# Patient Record
Sex: FEMALE | URBAN - METROPOLITAN AREA
[De-identification: names, ages, dates, MRNs, and addresses within clinical notes are randomized per-mention and may not be internally consistent; named-entity substitution may affect disease eponyms.]

---

## 2019-03-05 ENCOUNTER — WORRISOME GROWTH - SEE NOTE (OUTPATIENT)
Dept: URBAN - METROPOLITAN AREA CLINIC 32 | Facility: CLINIC | Age: 44
Setting detail: DERMATOLOGY
End: 2019-03-05

## 2019-03-05 DIAGNOSIS — L57.0 ACTINIC KERATOSIS: ICD-10-CM

## 2019-03-05 PROCEDURE — 99202 OFFICE O/P NEW SF 15 MIN: CPT

## 2020-08-18 ENCOUNTER — WEB ENCOUNTER (OUTPATIENT)
Dept: URBAN - METROPOLITAN AREA CLINIC 96 | Facility: CLINIC | Age: 45
End: 2020-08-18

## 2020-08-18 ENCOUNTER — OFFICE VISIT (OUTPATIENT)
Dept: URBAN - METROPOLITAN AREA CLINIC 96 | Facility: CLINIC | Age: 45
End: 2020-08-18
Payer: SELF-PAY

## 2020-08-18 DIAGNOSIS — Z09 FOLLOW UP: ICD-10-CM

## 2020-08-18 DIAGNOSIS — K51.90 ULCERATIVE COLITIS: ICD-10-CM

## 2020-08-18 DIAGNOSIS — Z76.0 MEDICATION REFILL: ICD-10-CM

## 2020-08-18 DIAGNOSIS — R13.10 DYSPHAGIA: ICD-10-CM

## 2020-08-18 PROCEDURE — G9622 NO UNHEAL ETOH USER: HCPCS | Performed by: INTERNAL MEDICINE

## 2020-08-18 PROCEDURE — 3017F COLORECTAL CA SCREEN DOC REV: CPT | Performed by: INTERNAL MEDICINE

## 2020-08-18 PROCEDURE — G8420 CALC BMI NORM PARAMETERS: HCPCS | Performed by: INTERNAL MEDICINE

## 2020-08-18 PROCEDURE — 1036F TOBACCO NON-USER: CPT | Performed by: INTERNAL MEDICINE

## 2020-08-18 PROCEDURE — G8482 FLU IMMUNIZE ORDER/ADMIN: HCPCS | Performed by: INTERNAL MEDICINE

## 2020-08-18 PROCEDURE — G8427 DOCREV CUR MEDS BY ELIG CLIN: HCPCS | Performed by: INTERNAL MEDICINE

## 2020-08-18 PROCEDURE — G9903 PT SCRN TBCO ID AS NON USER: HCPCS | Performed by: INTERNAL MEDICINE

## 2020-08-18 PROCEDURE — 99214 OFFICE O/P EST MOD 30 MIN: CPT | Performed by: INTERNAL MEDICINE

## 2020-08-18 RX ORDER — HYDROCORTISONE 100 MG/60ML
INSERT 60 MILLILITERS (100 MG) BY RECTAL ROUTE ONCE DAILY ENEMA RECTAL 1
OUTPATIENT
Start: 2018-03-16

## 2020-08-18 RX ORDER — HYOSCYAMINE SULFATE 0.12 MG/1
PLACE 1 TABLET UNDER TONGUE BY TRANSLINGUAL ROUTE 4 TIMES A DAY AS NEEDED TABLET, ORALLY DISINTEGRATING ORAL
Qty: 30 | Refills: 1 | Status: ACTIVE | COMMUNITY
Start: 2018-03-16 | End: 1900-01-01

## 2020-08-18 RX ORDER — PANTOPRAZOLE SODIUM 40 MG/1
1 TABLET TABLET, DELAYED RELEASE ORAL BID
Qty: 60 TABLET | Refills: 11 | OUTPATIENT
Start: 2020-08-18

## 2020-08-18 RX ORDER — SULFASALAZINE 500 MG/1
6 TABLET TABLET ORAL ONCE A DAY
Qty: 180 TABLET | Refills: 4 | OUTPATIENT
Start: 2020-08-18 | End: 2021-01-14

## 2020-08-18 RX ORDER — HYDROCORTISONE 100 MG/60ML
INSERT 60 MILLILITERS (100 MG) BY RECTAL ROUTE ONCE DAILY ENEMA RECTAL 1
Qty: 7 | Refills: 1 | Status: ACTIVE | COMMUNITY
Start: 2018-03-16 | End: 1900-01-01

## 2020-08-18 RX ORDER — HYOSCYAMINE SULFATE 0.12 MG/1
PLACE 1 TABLET UNDER TONGUE BY TRANSLINGUAL ROUTE 4 TIMES A DAY AS NEEDED TABLET, ORALLY DISINTEGRATING ORAL
OUTPATIENT
Start: 2018-03-16

## 2020-08-18 RX ORDER — MESALAMINE 1000 MG/1
1 SUPPOSITORY AT BEDTIME SUPPOSITORY RECTAL ONCE A DAY
Qty: 30 | Refills: 4 | OUTPATIENT
Start: 2020-08-18 | End: 2021-01-14

## 2020-08-18 NOTE — HPI-TODAY'S VISIT:
Anthony Chew is a 46 y/o indiv with left sided abdominal pain, currently on Apriso 4tab), here for refractory sxs. . Pt is referred by Dr. North. . Anthony was diagnosed with UC at age 19.  She was initially started on Asacol.  Stopped therapies while pregnant. But has never done great.  She has taken canasa suppository while pregnant. . Today on 8/18/2020, anthony reports that she has flares on and off.  She had a history of dysphagia and atypical chest pain associated with food getting stuck in her upper chest. She has associated nausea.  It improved with pantoprazole.  She has up to 2 BM per day when normal, but when she has a flare, she has mucus and blood in the stool, and then develops constipation.  She has to then take miralax.  She is having some urgency with blood and mucus.  Some left lower cramping abdominal pain, worse when she is laying down and after eating.  Sxs are better if she is on liquid diet.  Has lost some weight because of this.  Lot of fatigue. . No joint pain or rashes.  .  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 6/2019: fecal calprotectin 194 . Colonoscopy: 6/2019: A segmental area of mildly congested and erythematous mucosa was found in the rectum and in the recto-sigmoid colon.  Biopsies were taken with a cold forceps for histology. Normal mucosa was found in the sigmoid colon, in the descending colon, at the splenic flexure, in the transverse colon, at the hepatic flexure, in the ascending colon and in the cecum.  Biopsies were taken with a cold forceps for histology. . A Rightcolon,biopsy: Colonicmucosawithnosignificanthistopathology. Nohistologicevidenceofactive,chronicormicroscopiccolitis.     B Transversecolon,biopsy: Colonicmucosawithnosignificanthistopathology. Nohistologicevidenceofactive,chronicormicroscopiccolitis.     C Leftcolon,biopsy: Colonicmucosawithnosignificanthistopathology. Nohistologicevidenceofactive,chronicormicroscopiccolitis.     D Rectum,rectosigmoid,biopsy: Activecolitis.     E Rectum,biopsy: Activecolitis.  .  12/2016 The terminal ileum appeared normal.  Biopsies were taken with a cold forceps for histology. A patchy area of moderately erythematous mucosa was found in the rectum.  Biopsies were taken with a cold forceps for histology. Normal mucosa was found in the entire colon. Internal hemorrhoids were found during retroflexion.  The hemorrhoids were small. .

## 2020-08-19 ENCOUNTER — ERX REFILL RESPONSE (OUTPATIENT)
Dept: URBAN - METROPOLITAN AREA CLINIC 96 | Facility: CLINIC | Age: 45
End: 2020-08-19

## 2020-08-19 RX ORDER — MESALAMINE 1000 MG/1
1 SUPPOSITORY AT BEDTIME SUPPOSITORY RECTAL ONCE A DAY
Qty: 30 | Refills: 4 | OUTPATIENT

## 2020-08-19 RX ORDER — MESALAMINE 1000 MG/1
_INSERT 1 SUPPOSITORY RECTALLY AT BEDTIME SUPPOSITORY RECTAL
Qty: 30 SUPPOSITORY | Refills: 4 | OUTPATIENT

## 2020-10-19 ENCOUNTER — OFFICE VISIT (OUTPATIENT)
Dept: URBAN - METROPOLITAN AREA TELEHEALTH 2 | Facility: TELEHEALTH | Age: 45
End: 2020-10-19
Payer: SELF-PAY

## 2020-10-19 DIAGNOSIS — Z76.0 MEDICATION REFILL: ICD-10-CM

## 2020-10-19 DIAGNOSIS — R13.10 DYSPHAGIA: ICD-10-CM

## 2020-10-19 DIAGNOSIS — Z09 FOLLOW UP: ICD-10-CM

## 2020-10-19 DIAGNOSIS — K51.90 ULCERATIVE COLITIS: ICD-10-CM

## 2020-10-19 PROCEDURE — 1036F TOBACCO NON-USER: CPT | Performed by: INTERNAL MEDICINE

## 2020-10-19 PROCEDURE — 99214 OFFICE O/P EST MOD 30 MIN: CPT | Performed by: INTERNAL MEDICINE

## 2020-10-19 PROCEDURE — G8482 FLU IMMUNIZE ORDER/ADMIN: HCPCS | Performed by: INTERNAL MEDICINE

## 2020-10-19 PROCEDURE — G9903 PT SCRN TBCO ID AS NON USER: HCPCS | Performed by: INTERNAL MEDICINE

## 2020-10-19 PROCEDURE — G9622 NO UNHEAL ETOH USER: HCPCS | Performed by: INTERNAL MEDICINE

## 2020-10-19 PROCEDURE — 82306 VITAMIN D 25 HYDROXY: CPT | Performed by: INTERNAL MEDICINE

## 2020-10-19 PROCEDURE — 3017F COLORECTAL CA SCREEN DOC REV: CPT | Performed by: INTERNAL MEDICINE

## 2020-10-19 PROCEDURE — 82607 VITAMIN B-12: CPT | Performed by: INTERNAL MEDICINE

## 2020-10-19 PROCEDURE — G8420 CALC BMI NORM PARAMETERS: HCPCS | Performed by: INTERNAL MEDICINE

## 2020-10-19 PROCEDURE — G8427 DOCREV CUR MEDS BY ELIG CLIN: HCPCS | Performed by: INTERNAL MEDICINE

## 2020-10-19 RX ORDER — MESALAMINE 1000 MG/1
_INSERT 1 SUPPOSITORY RECTALLY AT BEDTIME SUPPOSITORY RECTAL
Qty: 30 SUPPOSITORY | Refills: 4 | Status: ACTIVE | COMMUNITY

## 2020-10-19 RX ORDER — HYDROCORTISONE 100 MG/60ML
INSERT 60 MILLILITERS (100 MG) BY RECTAL ROUTE ONCE DAILY ENEMA RECTAL 1
Status: ACTIVE | COMMUNITY
Start: 2018-03-16

## 2020-10-19 RX ORDER — PANTOPRAZOLE SODIUM 40 MG/1
1 TABLET TABLET, DELAYED RELEASE ORAL BID
Qty: 60 TABLET | Refills: 11 | Status: ACTIVE | COMMUNITY
Start: 2020-08-18

## 2020-10-19 RX ORDER — SULFASALAZINE 500 MG/1
6 TABLET TABLET ORAL ONCE A DAY
Qty: 180 TABLET | Refills: 4 | Status: ACTIVE | COMMUNITY
Start: 2020-08-18 | End: 2021-01-14

## 2020-10-19 RX ORDER — SULFASALAZINE 500 MG/1
6 TABLET TABLET ORAL ONCE A DAY
Qty: 540 TABLET | Refills: 4 | OUTPATIENT

## 2020-10-19 RX ORDER — HYOSCYAMINE SULFATE 0.12 MG/1
PLACE 1 TABLET UNDER TONGUE BY TRANSLINGUAL ROUTE 4 TIMES A DAY AS NEEDED TABLET, ORALLY DISINTEGRATING ORAL
Status: ACTIVE | COMMUNITY
Start: 2018-03-16

## 2020-10-19 NOTE — HPI-TODAY'S VISIT:
Jean Chew is a 44 y/o indiv with left sided UC, currently on sulfasalazine (4 tab) and previously  on Apriso (4tab), here for refractory sxs. . Pt is referred by Dr. North. . Pt was diagnosed with UC at age 19.  She was initially started on Asacol.  Stopped therapies while pregnant. But has never done great.  She has taken canasa suppository while pregnant. . Previously on 8/18/2020, pt reports that she has flares on and off.  She had a history of dysphagia and atypical chest pain associated with food getting stuck in her upper chest. She has associated nausea.  It improved with pantoprazole.  She has up to 2 BM per day when normal, but when she has a flare, she has mucus and blood in the stool, and then develops constipation.  She has to then take miralax.  She is having some urgency with blood and mucus.  Some left lower cramping abdominal pain, worse when she is laying down and after eating.  Sxs are better if she is on liquid diet.  Has lost some weight because of this.  Lot of fatigue. . Today on 10/19/2020, pt reports that since starting the sulfasalazine (4 tab), she is feeling great.  She no longer having abdominal pain, diarrhea or rectal bleeding. No longer taking Levsin.  No longer having to take the protonix.  Made her sleepy, so she took it at night. 	 . No joint pain or rashes. . 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 6/2019: fecal calprotectin 194 . Colonoscopy: 6/2019: A segmental area of mildly congested and erythematous mucosa was found in the rectum and in the recto-sigmoid colon.  Biopsies were taken with a cold forceps for histology. Normal mucosa was found in the sigmoid colon, in the descending colon, at the splenic flexure, in the transverse colon, at the hepatic flexure, in the ascending colon and in the cecum.  Biopsies were taken with a cold forceps for histology. . A Rightcolon,biopsy: Colonicmucosawithnosignificanthistopathology. Nohistologicevidenceofactive,chronicormicroscopiccolitis.     B Transversecolon,biopsy: Colonicmucosawithnosignificanthistopathology. Nohistologicevidenceofactive,chronicormicroscopiccolitis.     C Leftcolon,biopsy: Colonicmucosawithnosignificanthistopathology. Nohistologicevidenceofactive,chronicormicroscopiccolitis.     D Rectum,rectosigmoid,biopsy: Activecolitis.     E Rectum,biopsy: Activecolitis.  . 12/2016 The terminal ileum appeared normal.  Biopsies were taken with a cold forceps for histology. A patchy area of moderately erythematous mucosa was found in the rectum.  Biopsies were taken with a cold forceps for histology. Normal mucosa was found in the entire colon. Internal hemorrhoids were found during retroflexion.  The hemorrhoids were small. .

## 2020-10-28 ENCOUNTER — ACNE/ROSACEA (OUTPATIENT)
Dept: URBAN - METROPOLITAN AREA CLINIC 32 | Facility: CLINIC | Age: 45
Setting detail: DERMATOLOGY
End: 2020-10-28

## 2020-10-28 ENCOUNTER — RX ONLY (RX ONLY)
Age: 45
End: 2020-10-28

## 2020-10-28 DIAGNOSIS — L57.0 ACTINIC KERATOSIS: ICD-10-CM

## 2020-10-28 PROCEDURE — 99213 OFFICE O/P EST LOW 20 MIN: CPT

## 2020-11-20 ENCOUNTER — RX ONLY (RX ONLY)
Age: 45
End: 2020-11-20

## 2020-11-20 RX ORDER — SPIRONOLACTONE 50 MG/1
2 TABLET TABLET, FILM COATED ORAL NIGHTLY
Qty: 180 | Refills: 1
Start: 2020-11-20

## 2020-12-02 LAB
A/G RATIO: 2.4
ALBUMIN: 4.8
ALKALINE PHOSPHATASE: 46
ALT (SGPT): 14
AST (SGOT): 21
BASO (ABSOLUTE): 0.1
BASOS: 1
BILIRUBIN, TOTAL: 0.5
BUN/CREATININE RATIO: 12
BUN: 11
C-REACTIVE PROTEIN, QUANT: <1
CALCIUM: 9.2
CARBON DIOXIDE, TOTAL: 22
CHLORIDE: 100
CREATININE: 0.91
EGFR IF AFRICN AM: 88
EGFR IF NONAFRICN AM: 76
EOS (ABSOLUTE): 0.1
EOS: 1
FERRITIN, SERUM: 43
GLOBULIN, TOTAL: 2
GLUCOSE: 79
HEMATOCRIT: 39.8
HEMATOLOGY COMMENTS:: (no result)
HEMOGLOBIN: 13
IMMATURE CELLS: (no result)
IMMATURE GRANS (ABS): 0
IMMATURE GRANULOCYTES: 0
IRON BIND.CAP.(TIBC): 280
IRON SATURATION: 51
IRON: 142
LYMPHS (ABSOLUTE): 1.6
LYMPHS: 29
MCH: 28.3
MCHC: 32.7
MCV: 87
MONOCYTES(ABSOLUTE): 0.3
MONOCYTES: 6
NEUTROPHILS (ABSOLUTE): 3.4
NEUTROPHILS: 63
NRBC: (no result)
PLATELETS: 211
POTASSIUM: 4.3
PROTEIN, TOTAL: 6.8
RBC: 4.59
RDW: 13.4
SEDIMENTATION RATE-WESTERGREN: 2
SODIUM: 139
UIBC: 138
VITAMIN B12: 379
VITAMIN D, 25-HYDROXY: 40.8
WBC: 5.4

## 2021-06-22 ENCOUNTER — ERX REFILL RESPONSE (OUTPATIENT)
Dept: URBAN - METROPOLITAN AREA CLINIC 98 | Facility: CLINIC | Age: 46
End: 2021-06-22

## 2021-06-22 RX ORDER — SULFASALAZINE 500 MG/1
6 TABLET TABLET ORAL ONCE A DAY
Qty: 540 | Refills: 0

## 2022-01-21 ENCOUNTER — WEB ENCOUNTER (OUTPATIENT)
Dept: URBAN - METROPOLITAN AREA CLINIC 96 | Facility: CLINIC | Age: 47
End: 2022-01-21

## 2022-01-21 RX ORDER — PREDNISONE 10 MG/1
2 TABLETS TABLET ORAL ONCE A DAY
Qty: 60 | Refills: 0 | OUTPATIENT
Start: 2022-01-21 | End: 2022-02-20

## 2022-02-02 ENCOUNTER — OFFICE VISIT (OUTPATIENT)
Dept: URBAN - METROPOLITAN AREA CLINIC 96 | Facility: CLINIC | Age: 47
End: 2022-02-02
Payer: SELF-PAY

## 2022-02-02 ENCOUNTER — WEB ENCOUNTER (OUTPATIENT)
Dept: URBAN - METROPOLITAN AREA CLINIC 96 | Facility: CLINIC | Age: 47
End: 2022-02-02

## 2022-02-02 ENCOUNTER — LAB OUTSIDE AN ENCOUNTER (OUTPATIENT)
Dept: URBAN - METROPOLITAN AREA CLINIC 96 | Facility: CLINIC | Age: 47
End: 2022-02-02

## 2022-02-02 DIAGNOSIS — R13.10 DYSPHAGIA: ICD-10-CM

## 2022-02-02 DIAGNOSIS — R19.7 DIARRHEA: ICD-10-CM

## 2022-02-02 DIAGNOSIS — Z76.0 MEDICATION REFILL: ICD-10-CM

## 2022-02-02 DIAGNOSIS — K21.9 GERD: ICD-10-CM

## 2022-02-02 DIAGNOSIS — K92.1 HEMATOCHEZIA: ICD-10-CM

## 2022-02-02 DIAGNOSIS — Z91.89 COLON CANCER HIGH RISK: ICD-10-CM

## 2022-02-02 DIAGNOSIS — K51.90 ULCERATIVE COLITIS: ICD-10-CM

## 2022-02-02 DIAGNOSIS — Z09 FOLLOW UP: ICD-10-CM

## 2022-02-02 PROCEDURE — 99215 OFFICE O/P EST HI 40 MIN: CPT | Performed by: INTERNAL MEDICINE

## 2022-02-02 RX ORDER — PREDNISONE 10 MG/1
2 TABLETS TABLET ORAL ONCE A DAY
Qty: 60 | Refills: 0 | Status: ACTIVE | COMMUNITY
Start: 2022-01-21 | End: 2022-02-20

## 2022-02-02 RX ORDER — MESALAMINE 1000 MG/1
_INSERT 1 SUPPOSITORY RECTALLY AT BEDTIME SUPPOSITORY RECTAL
Qty: 30 SUPPOSITORY | Refills: 4 | Status: DISCONTINUED | COMMUNITY

## 2022-02-02 RX ORDER — PANTOPRAZOLE SODIUM 40 MG/1
1 TABLET TABLET, DELAYED RELEASE ORAL BID
Qty: 60 TABLET | Refills: 11 | Status: DISCONTINUED | COMMUNITY
Start: 2020-08-18

## 2022-02-02 RX ORDER — HYOSCYAMINE SULFATE 0.12 MG/1
PLACE 1 TABLET UNDER TONGUE BY TRANSLINGUAL ROUTE 4 TIMES A DAY AS NEEDED TABLET, ORALLY DISINTEGRATING ORAL
Status: DISCONTINUED | COMMUNITY
Start: 2018-03-16

## 2022-02-02 RX ORDER — MESALAMINE 1000 MG/1
1 SUPPOSITORY AT BEDTIME SUPPOSITORY RECTAL ONCE A DAY
Qty: 90 | Refills: 4 | OUTPATIENT
Start: 2022-02-02 | End: 2023-04-28

## 2022-02-02 RX ORDER — SULFASALAZINE 500 MG/1
6 TABLET TABLET ORAL ONCE A DAY
Qty: 540 TABLET | Refills: 4 | OUTPATIENT

## 2022-02-02 RX ORDER — SULFASALAZINE 500 MG/1
6 TABLET TABLET ORAL ONCE A DAY
Qty: 540 | Refills: 0 | Status: ACTIVE | COMMUNITY

## 2022-02-02 RX ORDER — HYDROCORTISONE 100 MG/60ML
_INSERT 60 MILLILITERS (100 MG) BY RECTAL ROUTE ONCE DAILY ENEMA RECTAL 1
Status: DISCONTINUED | COMMUNITY
Start: 2018-03-16

## 2022-02-02 NOTE — HPI-TODAY'S VISIT:
Jean Chew is a 47 y/o indiv with left sided UC, currently on sulfasalazine (3-6tab) and previously  on Apriso (4tab), here for refractory sxs. . Pt is referred by Dr. North. . Pt was diagnosed with UC at age 19.  She was initially started on Asacol.  Stopped therapies while pregnant. But has never done great.  She has taken canasa suppository while pregnant. . Previously on 8/18/2020, pt reports that she has flares on and off.  She had a history of dysphagia and atypical chest pain associated with food getting stuck in her upper chest. She has associated nausea.  It improved with pantoprazole.  She has up to 2 BM per day when normal, but when she has a flare, she has mucus and blood in the stool, and then develops constipation.  She has to then take miralax.  She is having some urgency with blood and mucus.  Some left lower cramping abdominal pain, worse when she is laying down and after eating.  Sxs are better if she is on liquid diet.  Has lost some weight because of this.  Lot of fatigue. . Previously on 10/19/2020, pt reports that since starting the sulfasalazine (4 tab), she is feeling great.  She no longer having abdominal pain, diarrhea or rectal bleeding. No longer taking Levsin.  No longer having to take the protonix.  Made her sleepy, so she took it at night. . Today on 2/2/2022, pt reports that she was doing very well until recently.  However, had a flare and we gave her prednisone taper from 20mg.  She is now having some upper GI sxs with silent reflux.  She feels as if something is stuck in her stomach, took OTC Pepcid/zantac.  She is having a lot of mucus in the stool, mucus as well, 4 BM per day.  Has LLQ pain as well, nothing makes it better or worse. . No joint pain or rashes. . 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 6/2019: fecal calprotectin 194 . Colonoscopy: 6/2019: A segmental area of mildly congested and erythematous mucosa was found in the rectum and in the recto-sigmoid colon.  Biopsies were taken with a cold forceps for histology. Normal mucosa was found in the sigmoid colon, in the descending colon, at the splenic flexure, in the transverse colon, at the hepatic flexure, in the ascending colon and in the cecum.  Biopsies were taken with a cold forceps for histology. . A Rightcolon,biopsy: Colonicmucosawithnosignificanthistopathology. Nohistologicevidenceofactive,chronicormicroscopiccolitis.     B Transversecolon,biopsy: Colonicmucosawithnosignificanthistopathology. Nohistologicevidenceofactive,chronicormicroscopiccolitis.     C Leftcolon,biopsy: Colonicmucosawithnosignificanthistopathology. Nohistologicevidenceofactive,chronicormicroscopiccolitis.     D Rectum,rectosigmoid,biopsy: Activecolitis.     E Rectum,biopsy: Activecolitis.  . 12/2016 The terminal ileum appeared normal.  Biopsies were taken with a cold forceps for histology. A patchy area of moderately erythematous mucosa was found in the rectum.  Biopsies were taken with a cold forceps for histology. Normal mucosa was found in the entire colon. Internal hemorrhoids were found during retroflexion.  The hemorrhoids were small. .

## 2022-02-03 ENCOUNTER — WEB ENCOUNTER (OUTPATIENT)
Dept: URBAN - METROPOLITAN AREA CLINIC 96 | Facility: CLINIC | Age: 47
End: 2022-02-03

## 2022-02-03 LAB
A/G RATIO: 2.4
ALBUMIN: 4.8
ALKALINE PHOSPHATASE: 42
ALT (SGPT): 15
AST (SGOT): 16
BASO (ABSOLUTE): 0.1
BASOS: 1
BILIRUBIN, TOTAL: 0.5
BUN/CREATININE RATIO: 17
BUN: 13
CALCIUM: 9.8
CARBON DIOXIDE, TOTAL: 20
CHLORIDE: 103
CHOLESTEROL, TOTAL: 283
COMMENT:: (no result)
CREATININE: 0.75
EGFR IF AFRICN AM: 110
EGFR IF NONAFRICN AM: 95
EOS (ABSOLUTE): 0
EOS: 0
GLOBULIN, TOTAL: 2
GLUCOSE: 92
HDL CHOLESTEROL: 115
HEMATOCRIT: 40.8
HEMATOLOGY COMMENTS:: (no result)
HEMOGLOBIN: 13.2
IMMATURE CELLS: (no result)
IMMATURE GRANS (ABS): 0.1
IMMATURE GRANULOCYTES: 1
LDL CHOL CALC (NIH): 161
LYMPHS (ABSOLUTE): 1.2
LYMPHS: 13
MCH: 27.8
MCHC: 32.4
MCV: 86
MONOCYTES(ABSOLUTE): 0.3
MONOCYTES: 3
NEUTROPHILS (ABSOLUTE): 7.6
NEUTROPHILS: 82
NRBC: (no result)
PLATELETS: 213
POTASSIUM: 4.3
PROTEIN, TOTAL: 6.8
RBC: 4.75
RDW: 13.7
SODIUM: 141
T4,FREE(DIRECT): 1.09
TRIGLYCERIDES: 50
TSH: 1.68
VLDL CHOLESTEROL CAL: 7
WBC: 9.1

## 2022-02-03 RX ORDER — HYOSCYAMINE SULFATE 0.12 MG/1
1 TAB TABLET, ORALLY DISINTEGRATING ORAL TID
Qty: 270 TABLET | Refills: 4
Start: 2018-03-16 | End: 2023-04-29

## 2022-03-18 PROBLEM — 40739000 DYSPHAGIA: Status: ACTIVE | Noted: 2022-02-02

## 2022-03-18 PROBLEM — 235595009 GASTROESOPHAGEAL REFLUX DISEASE: Status: ACTIVE | Noted: 2022-02-02

## 2022-03-28 ENCOUNTER — WEB ENCOUNTER (OUTPATIENT)
Dept: URBAN - METROPOLITAN AREA CLINIC 96 | Facility: CLINIC | Age: 47
End: 2022-03-28

## 2022-03-29 ENCOUNTER — WEB ENCOUNTER (OUTPATIENT)
Dept: URBAN - METROPOLITAN AREA SURGERY CENTER 18 | Facility: SURGERY CENTER | Age: 47
End: 2022-03-29

## 2022-03-31 ENCOUNTER — CLAIMS CREATED FROM THE CLAIM WINDOW (OUTPATIENT)
Dept: URBAN - METROPOLITAN AREA CLINIC 4 | Facility: CLINIC | Age: 47
End: 2022-03-31
Payer: SELF-PAY

## 2022-03-31 ENCOUNTER — OFFICE VISIT (OUTPATIENT)
Dept: URBAN - METROPOLITAN AREA SURGERY CENTER 18 | Facility: SURGERY CENTER | Age: 47
End: 2022-03-31
Payer: SELF-PAY

## 2022-03-31 DIAGNOSIS — K31.89 FOCAL FOVEOLAR HYPERPLASIA: ICD-10-CM

## 2022-03-31 DIAGNOSIS — K31.89 ACQUIRED DEFORMITY OF DUODENUM: ICD-10-CM

## 2022-03-31 DIAGNOSIS — K51.919 ULCERATIVE COLITIS, UNSPECIFIED WITH UNSPECIFIED COMPLICATIONS: ICD-10-CM

## 2022-03-31 DIAGNOSIS — K51.00 ACUTE ULCERATIVE PANCOLITIS: ICD-10-CM

## 2022-03-31 DIAGNOSIS — K63.89 CYST OF DUODENUM: ICD-10-CM

## 2022-03-31 DIAGNOSIS — R12 BURNING REFLUX: ICD-10-CM

## 2022-03-31 PROCEDURE — 88305 TISSUE EXAM BY PATHOLOGIST: CPT | Performed by: PATHOLOGY

## 2022-03-31 PROCEDURE — 88312 SPECIAL STAINS GROUP 1: CPT | Performed by: PATHOLOGY

## 2022-03-31 PROCEDURE — 45380 COLONOSCOPY AND BIOPSY: CPT | Performed by: INTERNAL MEDICINE

## 2022-03-31 PROCEDURE — 43239 EGD BIOPSY SINGLE/MULTIPLE: CPT | Performed by: INTERNAL MEDICINE

## 2022-03-31 PROCEDURE — G8907 PT DOC NO EVENTS ON DISCHARG: HCPCS | Performed by: INTERNAL MEDICINE

## 2022-03-31 RX ORDER — MESALAMINE 1000 MG/1
1 SUPPOSITORY AT BEDTIME SUPPOSITORY RECTAL ONCE A DAY
Qty: 90 | Refills: 4 | Status: ACTIVE | COMMUNITY
Start: 2022-02-02 | End: 2023-04-28

## 2022-03-31 RX ORDER — SULFASALAZINE 500 MG/1
6 TABLET TABLET ORAL ONCE A DAY
Qty: 540 TABLET | Refills: 4 | Status: ACTIVE | COMMUNITY

## 2022-03-31 RX ORDER — HYOSCYAMINE SULFATE 0.12 MG/1
1 TAB TABLET, ORALLY DISINTEGRATING ORAL TID
Qty: 270 TABLET | Refills: 4 | Status: ACTIVE | COMMUNITY
Start: 2018-03-16 | End: 2023-04-29

## 2022-04-16 ENCOUNTER — WEB ENCOUNTER (OUTPATIENT)
Dept: URBAN - METROPOLITAN AREA CLINIC 96 | Facility: CLINIC | Age: 47
End: 2022-04-16

## 2022-08-02 ENCOUNTER — WEB ENCOUNTER (OUTPATIENT)
Dept: URBAN - METROPOLITAN AREA CLINIC 96 | Facility: CLINIC | Age: 47
End: 2022-08-02

## 2022-08-02 RX ORDER — PANTOPRAZOLE SODIUM 40 MG/1
1 TABLET TABLET, DELAYED RELEASE ORAL BID
Qty: 60 TABLET | Refills: 11
Start: 2020-08-18

## 2022-10-10 ENCOUNTER — WEB ENCOUNTER (OUTPATIENT)
Dept: URBAN - METROPOLITAN AREA CLINIC 98 | Facility: CLINIC | Age: 47
End: 2022-10-10

## 2022-10-10 RX ORDER — PANTOPRAZOLE SODIUM 40 MG/1
1 TABLET TABLET, DELAYED RELEASE ORAL ONCE A DAY
Qty: 30 TABLET | Refills: 11

## 2023-01-18 ENCOUNTER — APPOINTMENT (OUTPATIENT)
Dept: URBAN - METROPOLITAN AREA CLINIC 208 | Age: 48
Setting detail: DERMATOLOGY
End: 2023-01-23

## 2023-01-18 DIAGNOSIS — L70.0 ACNE VULGARIS: ICD-10-CM

## 2023-01-18 DIAGNOSIS — Z41.9 ENCOUNTER FOR PROCEDURE FOR PURPOSES OTHER THAN REMEDYING HEALTH STATE, UNSPECIFIED: ICD-10-CM

## 2023-01-18 PROCEDURE — OTHER PRESCRIPTION: OTHER

## 2023-01-18 PROCEDURE — 99214 OFFICE O/P EST MOD 30 MIN: CPT

## 2023-01-18 PROCEDURE — OTHER PATIENT SPECIFIC COUNSELING: OTHER

## 2023-01-18 PROCEDURE — OTHER PRESCRIPTION MEDICATION MANAGEMENT: OTHER

## 2023-01-18 PROCEDURE — OTHER COUNSELING: OTHER

## 2023-01-18 PROCEDURE — OTHER MIPS QUALITY: OTHER

## 2023-01-18 RX ORDER — SPIRONOLACTONE 25 MG/1
TABLET, FILM COATED ORAL
Qty: 180 | Refills: 3 | Status: ERX | COMMUNITY
Start: 2023-01-18

## 2023-01-18 RX ORDER — CLINDAMYCIN PHOSPHATE AND BENZOYL PEROXIDE 10; 37.5 MG/G; MG/G
GEL TOPICAL
Qty: 50 | Refills: 3 | Status: ERX | COMMUNITY
Start: 2023-01-18

## 2023-01-18 RX ORDER — TAZAROTENE 0.45 MG/G
LOTION TOPICAL
Qty: 45 | Refills: 8 | Status: ERX | COMMUNITY
Start: 2023-01-18

## 2023-01-18 ASSESSMENT — LOCATION DETAILED DESCRIPTION DERM
LOCATION DETAILED: RIGHT INFERIOR CENTRAL MALAR CHEEK
LOCATION DETAILED: LEFT CENTRAL MALAR CHEEK

## 2023-01-18 ASSESSMENT — LOCATION SIMPLE DESCRIPTION DERM
LOCATION SIMPLE: LEFT CHEEK
LOCATION SIMPLE: RIGHT CHEEK

## 2023-01-18 ASSESSMENT — LOCATION ZONE DERM: LOCATION ZONE: FACE

## 2023-01-18 NOTE — HPI: PIMPLES (ACNE)
What Type Of Note Output Would You Prefer (Optional)?: Standard Output
Is This A New Presentation, Or A Follow-Up?: Follow Up Acne
Additional Comments (Use Complete Sentences): Pt has been out of tret 0.1% for 1 mo\\nD/C SPN due to HA

## 2023-01-18 NOTE — PROCEDURE: PATIENT SPECIFIC COUNSELING
Detail Level: Zone
Soraida recommends cheek filler with Dr. Alcala or Dr. Alvarez to replace lost volume. If not ready to commit to filler, consider Morpheus 8.

## 2023-01-18 NOTE — PROCEDURE: COUNSELING
Tazorac Counseling:  Patient advised that medication is irritating and drying.  Patient may need to apply sparingly and wash off after an hour before eventually leaving it on overnight.  The patient verbalized understanding of the proper use and possible adverse effects of tazorac.  All of the patient's questions and concerns were addressed.
High Dose Vitamin A Pregnancy And Lactation Text: High dose vitamin A therapy is contraindicated during pregnancy and breast feeding.
Sarecycline Pregnancy And Lactation Text: This medication is Pregnancy Category D and not consider safe during pregnancy. It is also excreted in breast milk.
Topical Sulfur Applications Counseling: Topical Sulfur Counseling: Patient counseled that this medication may cause skin irritation or allergic reactions.  In the event of skin irritation, the patient was advised to reduce the amount of the drug applied or use it less frequently.   The patient verbalized understanding of the proper use and possible adverse effects of topical sulfur application.  All of the patient's questions and concerns were addressed.
Bactrim Counseling:  I discussed with the patient the risks of sulfa antibiotics including but not limited to GI upset, allergic reaction, drug rash, diarrhea, dizziness, photosensitivity, and yeast infections.  Rarely, more serious reactions can occur including but not limited to aplastic anemia, agranulocytosis, methemoglobinemia, blood dyscrasias, liver or kidney failure, lung infiltrates or desquamative/blistering drug rashes.
Dapsone Counseling: I discussed with the patient the risks of dapsone including but not limited to hemolytic anemia, agranulocytosis, rashes, methemoglobinemia, kidney failure, peripheral neuropathy, headaches, GI upset, and liver toxicity.  Patients who start dapsone require monitoring including baseline LFTs and weekly CBCs for the first month, then every month thereafter.  The patient verbalized understanding of the proper use and possible adverse effects of dapsone.  All of the patient's questions and concerns were addressed.
Benzoyl Peroxide Counseling: Patient counseled that medicine may cause skin irritation and bleach clothing.  In the event of skin irritation, the patient was advised to reduce the amount of the drug applied or use it less frequently.   The patient verbalized understanding of the proper use and possible adverse effects of benzoyl peroxide.  All of the patient's questions and concerns were addressed.
Bpo Recommendations: OTC Panoxyl
Erythromycin Pregnancy And Lactation Text: This medication is Pregnancy Category B and is considered safe during pregnancy. It is also excreted in breast milk.
Detail Level: Detailed
Spironolactone Counseling: Patient advised regarding risks of birth defects (for female patients), dizziness, menstrual irregularities, breast tenderness, hyperkalemia. The patient may need blood work to monitor liver and kidney function and potassium levels while on therapy if a higher dose is prescribed. The patient was instructed to discontinue medication or increase water intake (>64 oz) if dizziness occurs. It may be recommended for you to titrate up with your dose of Spironolactone to attempt to avoid the side effects. If patient plans to get pregnant or becomes pregnant while on medication, it is advised to stop the medication and contact the office. The patient verbalized understanding of the proper use and possible adverse effects of spironolactone.  All of the patient's questions and concerns were addressed.
Doxycycline Counseling:  Patient counseled regarding possible photosensitivity and increased risk for sunburn.  Advised the patient to take dosage at least 30 min before laying down, and always take with a full meal and large glass of water.  Patient instructed to avoid sunlight, if possible.  When exposed to sunlight, patients should wear protective clothing, sunglasses, and sunscreen.  The patient was instructed to call the office immediately if the following severe adverse effects occur:  hearing changes, easy bruising/bleeding, severe headache, or vision changes.  The patient verbalized understanding of the proper use and possible adverse effects of doxycycline.  All of the patient's questions and concerns were addressed.
Use Enhanced Medication Counseling?: Yes
Aklief counseling:  Patient advised to apply a pea-sized amount only at bedtime. If too drying, patient may add a non-comedogenic moisturizer.  The most commonly reported side effects including irritation, redness, scaling, dryness, stinging, burning, itching, and increased risk of sunburn.  The patient verbalized understanding of the proper use and possible adverse effects of retinoids.  All of the patient's questions and concerns were addressed.
Topical Sulfur Applications Pregnancy And Lactation Text: This medication is Pregnancy Category C and has an unknown safety profile during pregnancy. It is unknown if this topical medication is excreted in breast milk.
Benzoyl Peroxide Pregnancy And Lactation Text: This medication is Pregnancy Category C. It is unknown if benzoyl peroxide is excreted in breast milk.
Dapsone Pregnancy And Lactation Text: This medication is Pregnancy Category C and is not considered safe during pregnancy or breast feeding.
Aklief Pregnancy And Lactation Text: It is unknown if this medication is safe to use during pregnancy.  It is unknown if this medication is excreted in breast milk.  Breastfeeding women should use the topical cream on the smallest area of the skin for the shortest time needed while breastfeeding.  Do not apply to nipple and areola.
Tazorac Pregnancy And Lactation Text: This medication is not safe during pregnancy. It is unknown if this medication is excreted in breast milk.
Minocycline Counseling: Patient advised regarding possible photosensitivity and discoloration of the teeth, skin, lips, tongue and gums.  Patient instructed to avoid sunlight, if possible.  When exposed to sunlight, patients should wear protective clothing, sunglasses, and sunscreen.  The patient was instructed to call the office immediately if the following severe adverse effects occur:  hearing changes, easy bruising/bleeding, severe headache, or vision changes.  The patient verbalized understanding of the proper use and possible adverse effects of minocycline.  All of the patient's questions and concerns were addressed.
Bactrim Pregnancy And Lactation Text: This medication is Pregnancy Category D and is known to cause fetal risk.  It is also excreted in breast milk.
Cleanser Recommendations: Cetaphil Gentle Cleanser\\nCerave Facial Cleanser
Isotretinoin Counseling: Patient advised medication is a high dose of Vitamin A and should be taken with food for best absorption. No other Vitamin A supplements should be taken. Monthly visits are required for refills of medication and expect the course to be an average of 6 months. Female patients are required to have 2 forms of contraceptive. Side effects reviewed, but not limited to, dryness and joint pain. Instructions on how to combat side effects with OTC products reviewed in visit today. Pt to contact office should depression,suicidal/homicidal thoughts, broken bone occur. Avoid the following while on the medication and 30 days after completion: pregnancy, tetracycline antibiotics, tylenol, alcohol, donating blood, ablative lasers, tattoos/piercings. Notify your provider if you have a surgery scheduled while on the medication. If you have a history of crohns, ulcerative colitis or depression it is recommended you let your provider know prior to starting therapy.
Winlevi Counseling:  I discussed with the patient the risks of topical clascoterone including but not limited to erythema, scaling, itching, and stinging. Patient voiced their understanding.
Patient Specific Counseling (Will Not Stick From Patient To Patient): Soraida recommends these moisturizers: \\nNeocutis bio cream \\nSkinBetter Trio Rebalancing Moisture Treatment
Spironolactone Pregnancy And Lactation Text: This medication can cause feminization of the male fetus and should be avoided during pregnancy. The active metabolite is also found in breast milk.
Birth Control Pills Pregnancy And Lactation Text: This medication should be avoided if pregnant and for the first 30 days post-partum.
Topical Retinoid counseling:  Patient advised to apply a pea-sized amount only at bedtime. Patient may be asked to titrate up to nightly by your provider which means to only use 3 nights/week and increase as it becomes tolerable. It is recommended to apply a non-comedogenic moisturizer such as Cetaphil or Cerave after your topical retinoid application. If too drying, patient may moisturize both before and after application. The patient verbalized understanding of the proper use and possible adverse effects of retinoids.  All of the patient's questions and concerns were addressed.
Isotretinoin Pregnancy And Lactation Text: This medication is Pregnancy Category X and is considered extremely dangerous during pregnancy. It is unknown if it is excreted in breast milk.
Birth Control Pills Counseling: Birth Control Pill Counseling: I discussed with the patient the potential side effects of OCPs including but not limited to increased risk of stroke, heart attack, thrombophlebitis, deep venous thrombosis, hepatic adenomas, breast changes, GI upset, headaches, and depression.  The patient verbalized understanding of the proper use and possible adverse effects of OCPs. All of the patient's questions and concerns were addressed. The 3 OCPs recommended to you for acne include: KAY, Orthotricyclen and Sprintec.
Moisturizer Recommendations: Cetaphil Moisturizing Lotion\\nCerave Moisturizing Lotion
Azithromycin Counseling:  I discussed with the patient the risks of azithromycin including but not limited to GI upset, allergic reaction, drug rash, diarrhea, and yeast infections.
Topical Clindamycin Counseling: Patient counseled that this medication may cause skin irritation or allergic reactions.  In the event of skin irritation, the patient was advised to reduce the amount of the drug applied or use it less frequently.   The patient verbalized understanding of the proper use and possible adverse effects of clindamycin.  All of the patient's questions and concerns were addressed.
Tetracycline Counseling: Patient counseled regarding possible photosensitivity and increased risk for sunburn.  Patient instructed to avoid sunlight, if possible.  When exposed to sunlight, patients should wear protective clothing, sunglasses, and sunscreen.  The patient was instructed to call the office immediately if the following severe adverse effects occur:  hearing changes, easy bruising/bleeding, severe headache, or vision changes.  The patient verbalized understanding of the proper use and possible adverse effects of tetracycline.  All of the patient's questions and concerns were addressed. Patient understands to avoid pregnancy while on therapy due to potential birth defects.
Topical Clindamycin Pregnancy And Lactation Text: This medication is Pregnancy Category B and is considered safe during pregnancy. It is unknown if it is excreted in breast milk.
Sarecycline Counseling: Patient advised regarding possible photosensitivity and discoloration of the teeth, skin, lips, tongue and gums.  Patient instructed to avoid sunlight, if possible.  When exposed to sunlight, patients should wear protective clothing, sunglasses, and sunscreen.  The patient was instructed to call the office immediately if the following severe adverse effects occur:  hearing changes, easy bruising/bleeding, severe headache, or vision changes.  The patient verbalized understanding of the proper use and possible adverse effects of sarecycline.  All of the patient's questions and concerns were addressed.
Azelaic Acid Counseling: Patient counseled that medicine may cause skin irritation and to avoid applying near the eyes.  In the event of skin irritation, the patient was advised to reduce the amount of the drug applied or use it less frequently.   The patient verbalized understanding of the proper use and possible adverse effects of azelaic acid.  All of the patient's questions and concerns were addressed.
Doxycycline Pregnancy And Lactation Text: This medication is Pregnancy Category D and not consider safe during pregnancy. It is also excreted in breast milk but is considered safe for shorter treatment courses.
Winlevi Pregnancy And Lactation Text: This medication is considered safe during pregnancy and breastfeeding.
Azelaic Acid Pregnancy And Lactation Text: This medication is considered safe during pregnancy and breast feeding.
Erythromycin Counseling:  I discussed with the patient the risks of erythromycin including but not limited to GI upset, allergic reaction, drug rash, diarrhea, increase in liver enzymes, and yeast infections.
Include Pregnancy/Lactation Warning?: No
Azithromycin Pregnancy And Lactation Text: This medication is considered safe during pregnancy and is also secreted in breast milk.
Topical Retinoid Pregnancy And Lactation Text: This medication is Pregnancy Category C. It is unknown if this medication is excreted in breast milk.
Sunscreen Recommendations: Cerave AM Facial Moisturizing Lotion SPF 30\\nElta MD UV Clear Broad Spectrum SPF 46 for acne prone skin
High Dose Vitamin A Counseling: Side effects reviewed, pt to contact office should one occur.

## 2023-01-18 NOTE — PROCEDURE: PRESCRIPTION MEDICATION MANAGEMENT
Detail Level: Zone
Render In Strict Bullet Format?: No
Discontinue Regimen: Tret 0.1%
Initiate Treatment: Spot treat upcoming breakouts with Onexton in the morning.\\nApply a pea-sized amount of Arazlo as a thin layer to entire face every night.\\nTake 1 Spironolactone pill once daily. If tolerable, increase to 2 tablets at night.

## 2023-01-29 ENCOUNTER — WEB ENCOUNTER (OUTPATIENT)
Dept: URBAN - METROPOLITAN AREA CLINIC 96 | Facility: CLINIC | Age: 48
End: 2023-01-29

## 2023-01-29 RX ORDER — MESALAMINE 1000 MG/1
1 SUPPOSITORY AT BEDTIME SUPPOSITORY RECTAL ONCE A DAY
Qty: 90 | Refills: 4
Start: 2022-02-02 | End: 2024-04-24

## 2023-01-30 ENCOUNTER — WEB ENCOUNTER (OUTPATIENT)
Dept: URBAN - METROPOLITAN AREA CLINIC 98 | Facility: CLINIC | Age: 48
End: 2023-01-30

## 2023-01-30 RX ORDER — MESALAMINE 1000 MG/1
1 SUPPOSITORY AT BEDTIME SUPPOSITORY RECTAL ONCE A DAY
Qty: 90 | Refills: 4
Start: 2022-02-02 | End: 2024-04-24

## 2023-03-03 ENCOUNTER — WEB ENCOUNTER (OUTPATIENT)
Dept: URBAN - METROPOLITAN AREA CLINIC 96 | Facility: CLINIC | Age: 48
End: 2023-03-03

## 2023-03-03 RX ORDER — SULFASALAZINE 500 MG/1
6 TABLET TABLET ORAL ONCE A DAY
Qty: 180 TABLET | Refills: 11

## 2023-03-08 ENCOUNTER — WEB ENCOUNTER (OUTPATIENT)
Dept: URBAN - METROPOLITAN AREA CLINIC 96 | Facility: CLINIC | Age: 48
End: 2023-03-08

## 2023-03-08 RX ORDER — SULFASALAZINE 500 MG/1
6 TABLET TABLET ORAL ONCE A DAY
Qty: 180 TABLET | Refills: 11
End: 2024-03-07

## 2023-04-25 ENCOUNTER — DASHBOARD ENCOUNTERS (OUTPATIENT)
Age: 48
End: 2023-04-25

## 2023-04-25 ENCOUNTER — OFFICE VISIT (OUTPATIENT)
Dept: URBAN - METROPOLITAN AREA CLINIC 96 | Facility: CLINIC | Age: 48
End: 2023-04-25
Payer: SELF-PAY

## 2023-04-25 VITALS
WEIGHT: 159 LBS | SYSTOLIC BLOOD PRESSURE: 124 MMHG | HEART RATE: 69 BPM | HEIGHT: 68 IN | TEMPERATURE: 97 F | DIASTOLIC BLOOD PRESSURE: 82 MMHG | BODY MASS INDEX: 24.1 KG/M2

## 2023-04-25 DIAGNOSIS — R13.10 DYSPHAGIA: ICD-10-CM

## 2023-04-25 DIAGNOSIS — R19.7 DIARRHEA: ICD-10-CM

## 2023-04-25 DIAGNOSIS — Z76.0 MEDICATION REFILL: ICD-10-CM

## 2023-04-25 DIAGNOSIS — Z91.89 COLON CANCER HIGH RISK: ICD-10-CM

## 2023-04-25 DIAGNOSIS — K51.90 ULCERATIVE COLITIS: ICD-10-CM

## 2023-04-25 DIAGNOSIS — Z09 FOLLOW UP: ICD-10-CM

## 2023-04-25 DIAGNOSIS — K92.1 HEMATOCHEZIA: ICD-10-CM

## 2023-04-25 DIAGNOSIS — K21.9 GERD: ICD-10-CM

## 2023-04-25 PROCEDURE — 99215 OFFICE O/P EST HI 40 MIN: CPT | Performed by: INTERNAL MEDICINE

## 2023-04-25 RX ORDER — SULFASALAZINE 500 MG/1
6 TABLET TABLET ORAL ONCE A DAY
Qty: 180 TABLET | Refills: 11 | OUTPATIENT

## 2023-04-25 RX ORDER — HYOSCYAMINE SULFATE 0.12 MG/1
1 TAB TABLET, ORALLY DISINTEGRATING ORAL TID
Qty: 270 TABLET | Refills: 4 | COMMUNITY
Start: 2018-03-16 | End: 2023-04-29

## 2023-04-25 RX ORDER — SULFASALAZINE 500 MG/1
6 TABLET TABLET ORAL ONCE A DAY
Qty: 180 TABLET | Refills: 11 | COMMUNITY
End: 2024-03-07

## 2023-04-25 RX ORDER — MESALAMINE 1000 MG/1
1 SUPPOSITORY AT BEDTIME SUPPOSITORY RECTAL ONCE A DAY
Qty: 90 | Refills: 4 | COMMUNITY
Start: 2022-02-02 | End: 2024-04-24

## 2023-04-25 RX ORDER — MESALAMINE 1000 MG/1
1 SUPPOSITORY AT BEDTIME SUPPOSITORY RECTAL ONCE A DAY
Qty: 30 | Refills: 11 | OUTPATIENT

## 2023-04-25 RX ORDER — PREDNISONE 10 MG/1
1 TABLET TABLET ORAL ONCE A DAY
Qty: 30 TABLET | Refills: 0 | OUTPATIENT
Start: 2023-04-25 | End: 2023-05-25

## 2023-04-25 RX ORDER — PANTOPRAZOLE SODIUM 40 MG/1
1 TABLET TABLET, DELAYED RELEASE ORAL ONCE A DAY
Qty: 30 TABLET | Refills: 11 | OUTPATIENT
Start: 2023-04-25

## 2023-04-25 RX ORDER — PANTOPRAZOLE SODIUM 40 MG/1
1 TABLET TABLET, DELAYED RELEASE ORAL ONCE A DAY
Qty: 30 TABLET | Refills: 11 | COMMUNITY

## 2023-04-25 NOTE — PHYSICAL EXAM GASTROINTESTINAL
1st discharge call attempted. Chart reviewed. Left Message   Abdomen , soft, nontender, nondistended , no guarding or rigidity , no masses palpable , normal bowel sounds , Liver and Spleen , no hepatomegaly present , no hepatosplenomegaly , liver nontender , spleen not palpable

## 2023-04-25 NOTE — HPI-TODAY'S VISIT:
Jean Chew is a 49 y/o indiv with left sided UC, currently on sulfasalazine (4 tab) and previously  on Apriso (4tab), here for refractory sxs. . Pt is referred by Dr. North. . Pt was diagnosed with UC at age 19.  She was initially started on Asacol.  Stopped therapies while pregnant. But has never done great.  She has taken canasa suppository while pregnant. . Previously on 8/18/2020, pt reports that she has flares on and off.  She had a history of dysphagia and atypical chest pain associated with food getting stuck in her upper chest. She has associated nausea.  It improved with pantoprazole.  She has up to 2 BM per day when normal, but when she has a flare, she has mucus and blood in the stool, and then develops constipation.  She has to then take miralax.  She is having some urgency with blood and mucus.  Some left lower cramping abdominal pain, worse when she is laying down and after eating.  Sxs are better if she is on liquid diet.  Has lost some weight because of this.  Lot of fatigue. Previously on 10/19/2020, pt reports that since starting the sulfasalazine (4 tab), she is feeling great.  She no longer having abdominal pain, diarrhea or rectal bleeding. No longer taking Levsin.  No longer having to take the protonix.  Made her sleepy, so she took it at night. Previously on 2/2/2022, pt reports that she was doing very well until recently.  However, had a flare and we gave her prednisone taper from 20mg.  She is now having some upper GI sxs with silent reflux.  She feels as if something is stuck in her stomach, took OTC Pepcid/zantac.  She is having a lot of mucus in the stool, mucus as well, 4 BM per day.  Has LLQ pain as well, nothing makes it better or worse. . Today on 4/25/2023, pt reports she is doing well.  NO diarrhea, rectal bleeding.  Periodic abdominal pain, mucus about 2 times per week and occasional blood. . No joint pain or rashes. . 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 6/2019: fecal calprotectin 194 . Colonoscopy: 6/2019: A segmental area of mildly congested and erythematous mucosa was found in the rectum and in the recto-sigmoid colon.  Biopsies were taken with a cold forceps for histology. Normal mucosa was found in the sigmoid colon, in the descending colon, at the splenic flexure, in the transverse colon, at the hepatic flexure, in the ascending colon and in the cecum.  Biopsies were taken with a cold forceps for histology. . A Rightcolon,biopsy: Colonicmucosawithnosignificanthistopathology. Nohistologicevidenceofactive,chronicormicroscopiccolitis.     B Transversecolon,biopsy: Colonicmucosawithnosignificanthistopathology. Nohistologicevidenceofactive,chronicormicroscopiccolitis.     C Leftcolon,biopsy: Colonicmucosawithnosignificanthistopathology. Nohistologicevidenceofactive,chronicormicroscopiccolitis.     D Rectum,rectosigmoid,biopsy: Activecolitis.     E Rectum,biopsy: Activecolitis.  . 12/2016 The terminal ileum appeared normal.  Biopsies were taken with a cold forceps for histology. A patchy area of moderately erythematous mucosa was found in the rectum.  Biopsies were taken with a cold forceps for histology. Normal mucosa was found in the entire colon. Internal hemorrhoids were found during retroflexion.  The hemorrhoids were small. . 3/2022: The ascending colon and cecum appeared normal. Biopsies were taken with a cold forceps for histology. The pathology specimen was placed into Bottle Number 3. The transverse colon appeared normal. Biopsies were taken with a cold forceps for histology. The pathology specimen was placed into Bottle Number 4. The descending colon appeared normal. Biopsies were taken with a cold forceps for histology. The pathology specimen was placed into Bottle Number 5. The sigmoid colon appeared normal. Biopsies were taken with a cold forceps for histology. The pathology specimen was placed into Bottle Number 6. A patchy area of mildly erythematous mucosa was found in the rectum. This was biopsied with a cold forceps for histology. The pathology specimen was placed into Bottle Number 7.  The exam was otherwise without abnormality  Overall, good control of UC, lopes score 0 to 1 throughout . (A) Duodenum, Biopsy: NO SIGNIFICANT ABNORMALITY. (B) Stomach, Antrum, Biopsy: NO SIGNIFICANT ABNORMALITY. (C) Colon, Ascending, Biopsy: NO SIGNIFICANT ABNORMALITY. (D) Colon, Transverse, Biopsy: NO SIGNIFICANT ABNORMALITY. (E) Colon, Descending, Biopsy: NO SIGNIFICANT ABNORMALITY. (F) Colon, Sigmoid, Biopsy: NO SIGNIFICANT ABNORMALITY. (G) Rectum, Biopsy: DIFFUSE CHRONIC ACTIVE COLITIS, CONSISTENT WITH ULCERATIVE COLITIS. See Comment. Negative for Infectious Organisms, Dysplasia or Malignancy. COMMENT: The biopsies show a diffuse and uniform chronic active colitis with a universal involvement of the rectum and absence of granulomas, supporting a diagnosis of ulcerative colitis. The presence of histological features of chronicity excludes acute infectious colitis. .

## 2023-07-26 ENCOUNTER — RX ONLY (RX ONLY)
Age: 48
End: 2023-07-26

## 2023-07-26 RX ORDER — SPIRONOLACTONE 25 MG/1
TABLET, FILM COATED ORAL
Qty: 270 | Refills: 1 | Status: ERX

## 2023-12-18 ENCOUNTER — RX ONLY (RX ONLY)
Age: 48
End: 2023-12-18

## 2023-12-18 RX ORDER — TRETIONIN 0.5 MG/G
CREAM TOPICAL
Qty: 20 | Refills: 0 | Status: ERX | COMMUNITY
Start: 2023-12-18

## 2024-06-12 ENCOUNTER — APPOINTMENT (OUTPATIENT)
Dept: URBAN - METROPOLITAN AREA CLINIC 208 | Age: 49
Setting detail: DERMATOLOGY
End: 2024-06-12

## 2024-06-12 DIAGNOSIS — L70.0 ACNE VULGARIS: ICD-10-CM

## 2024-06-12 DIAGNOSIS — L81.1 CHLOASMA: ICD-10-CM

## 2024-06-12 PROCEDURE — OTHER MIPS QUALITY: OTHER

## 2024-06-12 PROCEDURE — OTHER PRESCRIPTION: OTHER

## 2024-06-12 PROCEDURE — 99214 OFFICE O/P EST MOD 30 MIN: CPT

## 2024-06-12 PROCEDURE — OTHER PATIENT SPECIFIC COUNSELING: OTHER

## 2024-06-12 PROCEDURE — OTHER COUNSELING: OTHER

## 2024-06-12 PROCEDURE — OTHER PRESCRIPTION MEDICATION MANAGEMENT: OTHER

## 2024-06-12 PROCEDURE — OTHER OTHER: OTHER

## 2024-06-12 RX ORDER — TRETIONIN 1 MG/G
CREAM TOPICAL
Qty: 45 | Refills: 3 | Status: ERX | COMMUNITY
Start: 2024-06-12

## 2024-06-12 RX ORDER — SPIRONOLACTONE 100 MG/1
TABLET, FILM COATED ORAL
Qty: 90 | Refills: 3 | Status: ERX | COMMUNITY
Start: 2024-06-12

## 2024-06-12 ASSESSMENT — LOCATION SIMPLE DESCRIPTION DERM
LOCATION SIMPLE: RIGHT FOREHEAD
LOCATION SIMPLE: LEFT CHEEK
LOCATION SIMPLE: LEFT FOREHEAD

## 2024-06-12 ASSESSMENT — LOCATION DETAILED DESCRIPTION DERM
LOCATION DETAILED: LEFT FOREHEAD
LOCATION DETAILED: RIGHT FOREHEAD
LOCATION DETAILED: LEFT CENTRAL MALAR CHEEK

## 2024-06-12 ASSESSMENT — LOCATION ZONE DERM: LOCATION ZONE: FACE

## 2024-06-12 NOTE — HPI: PIMPLES (ACNE)
How Severe Is Your Acne?: mild
Is This A New Presentation, Or A Follow-Up?: Follow Up Acne
Additional Comments (Use Complete Sentences): Pt currently on spironolactone 75mg take 3 of 25mg, would like to just take 1 pill, would like to know if she needs to increase to 100mg once daily, no side effects. Pt no longer using Arazlo, would like to go back to tretinoin .1% cream, uses onexton as needed for spot treat

## 2024-06-12 NOTE — PROCEDURE: PRESCRIPTION MEDICATION MANAGEMENT
Initiate Treatment: -bleaching cream compound apply pea size amount nightly for next 2 months then change over to regular tretinoin nightly for 2 months, repeat
Render In Strict Bullet Format?: Yes
Detail Level: Zone

## 2024-06-12 NOTE — PROCEDURE: COUNSELING
Minocycline Pregnancy And Lactation Text: This medication is Pregnancy Category D and not consider safe during pregnancy. It is also excreted in breast milk.
Topical Retinoid Pregnancy And Lactation Text: This medication is Pregnancy Category C. It is unknown if this medication is excreted in breast milk.
Azelaic Acid Counseling: Patient counseled that medicine may cause skin irritation and to avoid applying near the eyes.  In the event of skin irritation, the patient was advised to reduce the amount of the drug applied or use it less frequently.   The patient verbalized understanding of the proper use and possible adverse effects of azelaic acid.  All of the patient's questions and concerns were addressed.
Doxycycline Counseling:  Patient counseled regarding possible photosensitivity and increased risk for sunburn.  Advised the patient to take dosage at least 30 min before laying down, and always take with a full meal and large glass of water.  Patient instructed to avoid sunlight, if possible.  When exposed to sunlight, patients should wear protective clothing, sunglasses, and sunscreen.  The patient was instructed to call the office immediately if the following severe adverse effects occur:  hearing changes, easy bruising/bleeding, severe headache, or vision changes.  The patient verbalized understanding of the proper use and possible adverse effects of doxycycline.  All of the patient's questions and concerns were addressed.
Tetracycline Counseling: Patient counseled regarding possible photosensitivity and increased risk for sunburn.  Patient instructed to avoid sunlight, if possible.  When exposed to sunlight, patients should wear protective clothing, sunglasses, and sunscreen.  The patient was instructed to call the office immediately if the following severe adverse effects occur:  hearing changes, easy bruising/bleeding, severe headache, or vision changes.  The patient verbalized understanding of the proper use and possible adverse effects of tetracycline.  All of the patient's questions and concerns were addressed. Patient understands to avoid pregnancy while on therapy due to potential birth defects.
Isotretinoin Counseling: Patient advised medication is a high dose of Vitamin A and should be taken with food for best absorption. No other Vitamin A supplements should be taken. Monthly visits are required for refills of medication and expect the course to be an average of 6 months. Female patients are required to have 2 forms of contraceptive. Side effects reviewed, but not limited to, dryness and joint pain. Instructions on how to combat side effects with OTC products reviewed in visit today. Pt to contact office should depression,suicidal/homicidal thoughts, broken bone occur. Avoid the following while on the medication and 30 days after completion: pregnancy, tetracycline antibiotics, tylenol, alcohol, donating blood, ablative lasers, tattoos/piercings. Notify your provider if you have a surgery scheduled while on the medication. If you have a history of crohns, ulcerative colitis or depression it is recommended you let your provider know prior to starting therapy.
Include Pregnancy/Lactation Warning?: No
Spironolactone Pregnancy And Lactation Text: This medication can cause feminization of the male fetus and should be avoided during pregnancy. The active metabolite is also found in breast milk.
Topical Clindamycin Pregnancy And Lactation Text: This medication is Pregnancy Category B and is considered safe during pregnancy. It is unknown if it is excreted in breast milk.
Bactrim Pregnancy And Lactation Text: This medication is Pregnancy Category D and is known to cause fetal risk.  It is also excreted in breast milk.
Doxycycline Pregnancy And Lactation Text: This medication is Pregnancy Category D and not consider safe during pregnancy. It is also excreted in breast milk but is considered safe for shorter treatment courses.
Tazorac Counseling:  Patient advised that medication is irritating and drying.  Patient may need to apply sparingly and wash off after an hour before eventually leaving it on overnight.  The patient verbalized understanding of the proper use and possible adverse effects of tazorac.  All of the patient's questions and concerns were addressed.
Sarecycline Counseling: Patient advised regarding possible photosensitivity and discoloration of the teeth, skin, lips, tongue and gums.  Patient instructed to avoid sunlight, if possible.  When exposed to sunlight, patients should wear protective clothing, sunglasses, and sunscreen.  The patient was instructed to call the office immediately if the following severe adverse effects occur:  hearing changes, easy bruising/bleeding, severe headache, or vision changes.  The patient verbalized understanding of the proper use and possible adverse effects of sarecycline.  All of the patient's questions and concerns were addressed.
Azithromycin Counseling:  I discussed with the patient the risks of azithromycin including but not limited to GI upset, allergic reaction, drug rash, diarrhea, and yeast infections.
High Dose Vitamin A Counseling: Side effects reviewed, pt to contact office should one occur.
Birth Control Pills Counseling: Birth Control Pill Counseling: I discussed with the patient the potential side effects of OCPs including but not limited to increased risk of stroke, heart attack, thrombophlebitis, deep venous thrombosis, hepatic adenomas, breast changes, GI upset, headaches, and depression.  The patient verbalized understanding of the proper use and possible adverse effects of OCPs. All of the patient's questions and concerns were addressed. The 3 OCPs recommended to you for acne include: KAY, Orthotricyclen and Sprintec.
Topical Sulfur Applications Counseling: Topical Sulfur Counseling: Patient counseled that this medication may cause skin irritation or allergic reactions.  In the event of skin irritation, the patient was advised to reduce the amount of the drug applied or use it less frequently.   The patient verbalized understanding of the proper use and possible adverse effects of topical sulfur application.  All of the patient's questions and concerns were addressed.
Isotretinoin Pregnancy And Lactation Text: This medication is Pregnancy Category X and is considered extremely dangerous during pregnancy. It is unknown if it is excreted in breast milk.
Sunscreen Recommendations: Cerave AM Facial Moisturizing Lotion SPF 30\\nElta MD UV Clear Broad Spectrum SPF 46 for acne prone skin
Azelaic Acid Pregnancy And Lactation Text: This medication is considered safe during pregnancy and breast feeding.
Bpo Recommendations: OTC Panoxyl
High Dose Vitamin A Pregnancy And Lactation Text: High dose vitamin A therapy is contraindicated during pregnancy and breast feeding.
Use Enhanced Medication Counseling?: Yes
Azithromycin Pregnancy And Lactation Text: This medication is considered safe during pregnancy and is also secreted in breast milk.
Erythromycin Counseling:  I discussed with the patient the risks of erythromycin including but not limited to GI upset, allergic reaction, drug rash, diarrhea, increase in liver enzymes, and yeast infections.
Aklief counseling:  Patient advised to apply a pea-sized amount only at bedtime. If too drying, patient may add a non-comedogenic moisturizer.  The most commonly reported side effects including irritation, redness, scaling, dryness, stinging, burning, itching, and increased risk of sunburn.  The patient verbalized understanding of the proper use and possible adverse effects of retinoids.  All of the patient's questions and concerns were addressed.
Winlevi Counseling:  I discussed with the patient the risks of topical clascoterone including but not limited to erythema, scaling, itching, and stinging. Patient voiced their understanding.
Dapsone Counseling: I discussed with the patient the risks of dapsone including but not limited to hemolytic anemia, agranulocytosis, rashes, methemoglobinemia, kidney failure, peripheral neuropathy, headaches, GI upset, and liver toxicity.  Patients who start dapsone require monitoring including baseline LFTs and weekly CBCs for the first month, then every month thereafter.  The patient verbalized understanding of the proper use and possible adverse effects of dapsone.  All of the patient's questions and concerns were addressed.
Benzoyl Peroxide Counseling: Patient counseled that medicine may cause skin irritation and bleach clothing.  In the event of skin irritation, the patient was advised to reduce the amount of the drug applied or use it less frequently.   The patient verbalized understanding of the proper use and possible adverse effects of benzoyl peroxide.  All of the patient's questions and concerns were addressed.
Cleanser Recommendations: Cetaphil Gentle Cleanser\\nCerave Facial Cleanser
Birth Control Pills Pregnancy And Lactation Text: This medication should be avoided if pregnant and for the first 30 days post-partum.
Topical Sulfur Applications Pregnancy And Lactation Text: This medication is Pregnancy Category C and has an unknown safety profile during pregnancy. It is unknown if this topical medication is excreted in breast milk.
Tazorac Pregnancy And Lactation Text: This medication is not safe during pregnancy. It is unknown if this medication is excreted in breast milk.
Dapsone Pregnancy And Lactation Text: This medication is Pregnancy Category C and is not considered safe during pregnancy or breast feeding.
Detail Level: Zone
Winlevi Pregnancy And Lactation Text: This medication is considered safe during pregnancy and breastfeeding.
Bactrim Counseling:  I discussed with the patient the risks of sulfa antibiotics including but not limited to GI upset, allergic reaction, drug rash, diarrhea, dizziness, photosensitivity, and yeast infections.  Rarely, more serious reactions can occur including but not limited to aplastic anemia, agranulocytosis, methemoglobinemia, blood dyscrasias, liver or kidney failure, lung infiltrates or desquamative/blistering drug rashes.
Minocycline Counseling: Patient advised regarding possible photosensitivity and discoloration of the teeth, skin, lips, tongue and gums.  Patient instructed to avoid sunlight, if possible.  When exposed to sunlight, patients should wear protective clothing, sunglasses, and sunscreen.  The patient was instructed to call the office immediately if the following severe adverse effects occur:  hearing changes, easy bruising/bleeding, severe headache, or vision changes.  The patient verbalized understanding of the proper use and possible adverse effects of minocycline.  All of the patient's questions and concerns were addressed.
Topical Retinoid counseling:  Patient advised to apply a pea-sized amount only at bedtime. Patient may be asked to titrate up to nightly by your provider which means to only use 3 nights/week and increase as it becomes tolerable. It is recommended to apply a non-comedogenic moisturizer such as Cetaphil or Cerave after your topical retinoid application. If too drying, patient may moisturize both before and after application. The patient verbalized understanding of the proper use and possible adverse effects of retinoids.  All of the patient's questions and concerns were addressed.
Topical Clindamycin Counseling: Patient counseled that this medication may cause skin irritation or allergic reactions.  In the event of skin irritation, the patient was advised to reduce the amount of the drug applied or use it less frequently.   The patient verbalized understanding of the proper use and possible adverse effects of clindamycin.  All of the patient's questions and concerns were addressed.
Spironolactone Counseling: Patient advised regarding risks of birth defects (for female patients), dizziness, menstrual irregularities, breast tenderness, hyperkalemia. The patient may need blood work to monitor liver and kidney function and potassium levels while on therapy if a higher dose is prescribed. The patient was instructed to discontinue medication or increase water intake (>64 oz) if dizziness occurs. It may be recommended for you to titrate up with your dose of Spironolactone to attempt to avoid the side effects. If patient plans to get pregnant or becomes pregnant while on medication, it is advised to stop the medication and contact the office. The patient verbalized understanding of the proper use and possible adverse effects of spironolactone.  All of the patient's questions and concerns were addressed.
Benzoyl Peroxide Pregnancy And Lactation Text: This medication is Pregnancy Category C. It is unknown if benzoyl peroxide is excreted in breast milk.
Moisturizer Recommendations: Cetaphil Moisturizing Lotion\\nCerave Moisturizing Lotion
Aklief Pregnancy And Lactation Text: It is unknown if this medication is safe to use during pregnancy.  It is unknown if this medication is excreted in breast milk.  Breastfeeding women should use the topical cream on the smallest area of the skin for the shortest time needed while breastfeeding.  Do not apply to nipple and areola.
Erythromycin Pregnancy And Lactation Text: This medication is Pregnancy Category B and is considered safe during pregnancy. It is also excreted in breast milk.

## 2024-06-12 NOTE — PROCEDURE: OTHER
Other (Free Text): Refills good for one year
Note Text (......Xxx Chief Complaint.): This diagnosis correlates with the
Detail Level: Zone
Render Risk Assessment In Note?: no

## 2024-07-10 ENCOUNTER — WEB ENCOUNTER (OUTPATIENT)
Dept: URBAN - METROPOLITAN AREA CLINIC 96 | Facility: CLINIC | Age: 49
End: 2024-07-10

## 2024-07-10 RX ORDER — SULFASALAZINE 500 MG/1
6 TABLET TABLET ORAL ONCE A DAY
Qty: 180 TABLET | Refills: 11
End: 2025-07-07

## 2024-08-07 ENCOUNTER — OFFICE VISIT (OUTPATIENT)
Dept: URBAN - METROPOLITAN AREA CLINIC 96 | Facility: CLINIC | Age: 49
End: 2024-08-07

## 2024-10-02 ENCOUNTER — OFFICE VISIT (OUTPATIENT)
Dept: URBAN - METROPOLITAN AREA CLINIC 96 | Facility: CLINIC | Age: 49
End: 2024-10-02
Payer: COMMERCIAL

## 2024-10-02 ENCOUNTER — TELEPHONE ENCOUNTER (OUTPATIENT)
Dept: URBAN - METROPOLITAN AREA CLINIC 96 | Facility: CLINIC | Age: 49
End: 2024-10-02

## 2024-10-02 ENCOUNTER — LAB OUTSIDE AN ENCOUNTER (OUTPATIENT)
Dept: URBAN - METROPOLITAN AREA CLINIC 96 | Facility: CLINIC | Age: 49
End: 2024-10-02

## 2024-10-02 VITALS
HEIGHT: 68 IN | SYSTOLIC BLOOD PRESSURE: 122 MMHG | HEART RATE: 73 BPM | DIASTOLIC BLOOD PRESSURE: 77 MMHG | WEIGHT: 152.6 LBS | BODY MASS INDEX: 23.13 KG/M2

## 2024-10-02 DIAGNOSIS — Z09 FOLLOW UP: ICD-10-CM

## 2024-10-02 DIAGNOSIS — Z76.0 MEDICATION REFILL: ICD-10-CM

## 2024-10-02 DIAGNOSIS — R19.7 DIARRHEA: ICD-10-CM

## 2024-10-02 DIAGNOSIS — K21.9 GERD: ICD-10-CM

## 2024-10-02 DIAGNOSIS — K51.90 ULCERATIVE COLITIS: ICD-10-CM

## 2024-10-02 DIAGNOSIS — R13.10 DYSPHAGIA: ICD-10-CM

## 2024-10-02 DIAGNOSIS — K92.1 HEMATOCHEZIA: ICD-10-CM

## 2024-10-02 DIAGNOSIS — Z91.89 COLON CANCER HIGH RISK: ICD-10-CM

## 2024-10-02 PROCEDURE — 99214 OFFICE O/P EST MOD 30 MIN: CPT | Performed by: INTERNAL MEDICINE

## 2024-10-02 RX ORDER — PANTOPRAZOLE SODIUM 40 MG/1
1 TABLET TABLET, DELAYED RELEASE ORAL ONCE A DAY
Qty: 30 TABLET | Refills: 11 | Status: ON HOLD | COMMUNITY
Start: 2023-04-25

## 2024-10-02 RX ORDER — MESALAMINE 1000 MG/1
1 SUPPOSITORY AT BEDTIME SUPPOSITORY RECTAL ONCE A DAY
Qty: 30 | Refills: 11 | Status: ACTIVE | COMMUNITY

## 2024-10-02 RX ORDER — PANTOPRAZOLE SODIUM 40 MG/1
1 TABLET TABLET, DELAYED RELEASE ORAL ONCE A DAY
Qty: 90 | Refills: 3

## 2024-10-02 RX ORDER — MESALAMINE 1000 MG/1
1 SUPPOSITORY AT BEDTIME SUPPOSITORY RECTAL ONCE A DAY
Qty: 30 | Refills: 11 | OUTPATIENT

## 2024-10-02 RX ORDER — MESALAMINE 1000 MG/1
1 SUPPOSITORY AT BEDTIME SUPPOSITORY RECTAL ONCE A DAY
Qty: 90 | Refills: 3

## 2024-10-02 RX ORDER — PANTOPRAZOLE SODIUM 40 MG/1
1 TABLET TABLET, DELAYED RELEASE ORAL ONCE A DAY
Qty: 30 TABLET | Refills: 11 | OUTPATIENT

## 2024-10-02 RX ORDER — SULFASALAZINE 500 MG/1
6 TABLET TABLET ORAL ONCE A DAY
Qty: 180 TABLET | Refills: 11 | OUTPATIENT

## 2024-10-02 RX ORDER — SULFASALAZINE 500 MG/1
6 TABLET TABLET ORAL ONCE A DAY
Qty: 540 | Refills: 3

## 2024-10-02 RX ORDER — PANTOPRAZOLE SODIUM 40 MG/1
1 TABLET TABLET, DELAYED RELEASE ORAL ONCE A DAY
Qty: 30 TABLET | Refills: 11 | Status: ON HOLD | COMMUNITY

## 2024-10-02 RX ORDER — SULFASALAZINE 500 MG/1
6 TABLET TABLET ORAL ONCE A DAY
Qty: 180 TABLET | Refills: 11 | Status: ACTIVE | COMMUNITY
End: 2025-07-07

## 2024-10-02 NOTE — HPI-TODAY'S VISIT:
Jean Chew is a 48 y/o indiv with left sided UC, currently on sulfasalazine (4 tab) and previously  on Apriso (4tab), here for refractory sxs. . Pt is referred by Dr. North. . Pt was diagnosed with UC at age 19.  She was initially started on Asacol.  Stopped therapies while pregnant. But has never done great.  She has taken canasa suppository while pregnant. . Previously on 8/18/2020, pt reports that she has flares on and off.  She had a history of dysphagia and atypical chest pain associated with food getting stuck in her upper chest. She has associated nausea.  It improved with pantoprazole.  She has up to 2 BM per day when normal, but when she has a flare, she has mucus and blood in the stool, and then develops constipation.  She has to then take miralax.  She is having some urgency with blood and mucus.  Some left lower cramping abdominal pain, worse when she is laying down and after eating.  Sxs are better if she is on liquid diet.  Has lost some weight because of this.  Lot of fatigue. Previously on 10/19/2020, pt reports that since starting the sulfasalazine (4 tab), she is feeling great.  She no longer having abdominal pain, diarrhea or rectal bleeding. No longer taking Levsin.  No longer having to take the protonix.  Made her sleepy, so she took it at night. Previously on 2/2/2022, pt reports that she was doing very well until recently.  However, had a flare and we gave her prednisone taper from 20mg.  She is now having some upper GI sxs with silent reflux.  She feels as if something is stuck in her stomach, took OTC Pepcid/zantac.  She is having a lot of mucus in the stool, mucus as well, 4 BM per day.  Has LLQ pain as well, nothing makes it better or worse. Prev on 4/25/2023, pt reports she is doing well.  NO diarrhea, rectal bleeding.  Periodic abdominal pain, mucus about 2 times per week and occasional blood. . Today on 10/2/2024, pt that she had a flare due to stress, increased her sulfa to 6 and restarted her canasa to canasa for about 10 days, increased her water intake.  Sxs are back to normal, no longer having mucus and blood. . No joint pain or rashes. . 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 6/2019: fecal calprotectin 194 . Colonoscopy: 6/2019: A segmental area of mildly congested and erythematous mucosa was found in the rectum and in the recto-sigmoid colon.  Biopsies were taken with a cold forceps for histology. Normal mucosa was found in the sigmoid colon, in the descending colon, at the splenic flexure, in the transverse colon, at the hepatic flexure, in the ascending colon and in the cecum.  Biopsies were taken with a cold forceps for histology. . A Rightcolon,biopsy: Colonicmucosawithnosignificanthistopathology. Nohistologicevidenceofactive,chronicormicroscopiccolitis.     B Transversecolon,biopsy: Colonicmucosawithnosignificanthistopathology. Nohistologicevidenceofactive,chronicormicroscopiccolitis.     C Leftcolon,biopsy: Colonicmucosawithnosignificanthistopathology. Nohistologicevidenceofactive,chronicormicroscopiccolitis.     D Rectum,rectosigmoid,biopsy: Activecolitis.     E Rectum,biopsy: Activecolitis.  . 12/2016 The terminal ileum appeared normal.  Biopsies were taken with a cold forceps for histology. A patchy area of moderately erythematous mucosa was found in the rectum.  Biopsies were taken with a cold forceps for histology. Normal mucosa was found in the entire colon. Internal hemorrhoids were found during retroflexion.  The hemorrhoids were small. . 3/2022: The ascending colon and cecum appeared normal. Biopsies were taken with a cold forceps for histology. The pathology specimen was placed into Bottle Number 3. The transverse colon appeared normal. Biopsies were taken with a cold forceps for histology. The pathology specimen was placed into Bottle Number 4. The descending colon appeared normal. Biopsies were taken with a cold forceps for histology. The pathology specimen was placed into Bottle Number 5. The sigmoid colon appeared normal. Biopsies were taken with a cold forceps for histology. The pathology specimen was placed into Bottle Number 6. A patchy area of mildly erythematous mucosa was found in the rectum. This was biopsied with a cold forceps for histology. The pathology specimen was placed into Bottle Number 7.  The exam was otherwise without abnormality  Overall, good control of UC, lopes score 0 to 1 throughout . (A) Duodenum, Biopsy: NO SIGNIFICANT ABNORMALITY. (B) Stomach, Antrum, Biopsy: NO SIGNIFICANT ABNORMALITY. (C) Colon, Ascending, Biopsy: NO SIGNIFICANT ABNORMALITY. (D) Colon, Transverse, Biopsy: NO SIGNIFICANT ABNORMALITY. (E) Colon, Descending, Biopsy: NO SIGNIFICANT ABNORMALITY. (F) Colon, Sigmoid, Biopsy: NO SIGNIFICANT ABNORMALITY. (G) Rectum, Biopsy: DIFFUSE CHRONIC ACTIVE COLITIS, CONSISTENT WITH ULCERATIVE COLITIS. See Comment. Negative for Infectious Organisms, Dysplasia or Malignancy. COMMENT: The biopsies show a diffuse and uniform chronic active colitis with a universal involvement of the rectum and absence of granulomas, supporting a diagnosis of ulcerative colitis. The presence of histological features of chronicity excludes acute infectious colitis. .

## 2024-10-02 NOTE — PHYSICAL EXAM HENT:
Called patient and left message to schedule appt-- Left adrenal mass-referral Dr Irene Pardo Head, normocephalic, atraumatic, Face, Face within normal limits, Ears, External ears within normal limits, Nose/Nasopharynx, External nose normal appearance, nares patent, no nasal discharge, Mouth and Throat, Oral cavity appearance normal, Lips, Appearance normal

## 2025-07-25 ENCOUNTER — OFFICE VISIT (OUTPATIENT)
Dept: URBAN - METROPOLITAN AREA SURGERY CENTER 18 | Facility: SURGERY CENTER | Age: 50
End: 2025-07-25

## 2025-07-25 ENCOUNTER — CLAIMS CREATED FROM THE CLAIM WINDOW (OUTPATIENT)
Dept: URBAN - METROPOLITAN AREA CLINIC 4 | Facility: CLINIC | Age: 50
End: 2025-07-25
Payer: COMMERCIAL

## 2025-07-25 DIAGNOSIS — K63.89 OTHER SPECIFIED DISEASES OF INTESTINE: ICD-10-CM

## 2025-07-25 PROCEDURE — 88305 TISSUE EXAM BY PATHOLOGIST: CPT | Performed by: PATHOLOGY

## 2025-07-25 RX ORDER — SULFASALAZINE 500 MG/1
6 TABLET TABLET ORAL ONCE A DAY
Qty: 540 | Refills: 3 | Status: ACTIVE | COMMUNITY

## 2025-07-25 RX ORDER — PANTOPRAZOLE SODIUM 40 MG/1
1 TABLET TABLET, DELAYED RELEASE ORAL ONCE A DAY
Qty: 30 TABLET | Refills: 11 | Status: ON HOLD | COMMUNITY

## 2025-07-25 RX ORDER — MESALAMINE 1000 MG/1
1 SUPPOSITORY AT BEDTIME SUPPOSITORY RECTAL ONCE A DAY
Qty: 90 | Refills: 3 | Status: ACTIVE | COMMUNITY

## 2025-07-25 RX ORDER — PANTOPRAZOLE SODIUM 40 MG/1
1 TABLET TABLET, DELAYED RELEASE ORAL ONCE A DAY
Qty: 90 | Refills: 3 | Status: ACTIVE | COMMUNITY